# Patient Record
Sex: MALE | Race: WHITE | NOT HISPANIC OR LATINO | URBAN - METROPOLITAN AREA
[De-identification: names, ages, dates, MRNs, and addresses within clinical notes are randomized per-mention and may not be internally consistent; named-entity substitution may affect disease eponyms.]

---

## 2021-01-11 ENCOUNTER — OFFICE VISIT (OUTPATIENT)
Dept: URBAN - METROPOLITAN AREA CLINIC 23 | Facility: CLINIC | Age: 61
End: 2021-01-11
Payer: SELF-PAY

## 2021-01-11 ENCOUNTER — DASHBOARD ENCOUNTERS (OUTPATIENT)
Age: 61
End: 2021-01-11

## 2021-01-11 DIAGNOSIS — K75.4 AUTOIMMUNE HEPATITIS: ICD-10-CM

## 2021-01-11 DIAGNOSIS — K51.80 CHRONIC PANCOLONIC ULCERATIVE COLITIS: ICD-10-CM

## 2021-01-11 PROBLEM — 408335007 AUTOIMMUNE HEPATITIS: Status: ACTIVE | Noted: 2021-01-11

## 2021-01-11 PROCEDURE — 99213 OFFICE O/P EST LOW 20 MIN: CPT | Performed by: INTERNAL MEDICINE

## 2021-01-11 PROCEDURE — G9903 PT SCRN TBCO ID AS NON USER: HCPCS | Performed by: INTERNAL MEDICINE

## 2021-01-11 PROCEDURE — G8427 DOCREV CUR MEDS BY ELIG CLIN: HCPCS | Performed by: INTERNAL MEDICINE

## 2021-01-11 PROCEDURE — 1036F TOBACCO NON-USER: CPT | Performed by: INTERNAL MEDICINE

## 2021-01-11 PROCEDURE — 3017F COLORECTAL CA SCREEN DOC REV: CPT | Performed by: INTERNAL MEDICINE

## 2021-01-11 PROCEDURE — G8482 FLU IMMUNIZE ORDER/ADMIN: HCPCS | Performed by: INTERNAL MEDICINE

## 2021-01-11 PROCEDURE — G8417 CALC BMI ABV UP PARAM F/U: HCPCS | Performed by: INTERNAL MEDICINE

## 2021-01-11 RX ORDER — FOLIC ACID 1 MG/1
1 TABLET TABLET ORAL ONCE A DAY
Status: ACTIVE | COMMUNITY

## 2021-01-11 RX ORDER — MESALAMINE 1.2 G/1
TAKE 1 TABLET BY ORAL ROUTE DAILY FOR 90 DAYS TABLET, DELAYED RELEASE ORAL 1
Qty: 90 | Refills: 5 | Status: ACTIVE | COMMUNITY
Start: 2019-11-25 | End: 2021-05-18

## 2021-01-11 RX ORDER — MESALAMINE 1.2 G/1
TAKE 1 TABLET BY ORAL ROUTE DAILY FOR 90 DAYS TABLET, DELAYED RELEASE ORAL 1
Qty: 90 | Refills: 5
Start: 2019-11-25

## 2021-01-11 RX ORDER — BIOTIN 5 MG
AS DIRECTED TABLET ORAL
Status: ACTIVE | COMMUNITY

## 2021-01-11 RX ORDER — ZINC 25 MG
1 TABLET TABLET ORAL ONCE A DAY
Status: ACTIVE | COMMUNITY

## 2021-01-11 RX ORDER — TURMERIC/TURMERIC ROOT EXTRACT 450MG-50MG
AS DIRECTED CAPSULE ORAL
Status: ACTIVE | COMMUNITY

## 2021-01-11 RX ORDER — AZATHIOPRINE 50 1/1
TAKE 1 TABLET (50 MG) BY ORAL ROUTE ONCE DAILY FOR 30 DAYS TABLET ORAL 1
Qty: 30 | Refills: 5
Start: 2020-03-23

## 2021-01-11 NOTE — HPI-OTHER HISTORIES
60M UC AI here for f/u meds--lialda 1 tab a day, azathioprine 1 tab a day TODAY he is here for routine f/u. feels fine. no complaints BM once a day formed no blood . no change in stool caliber

## 2021-01-12 LAB
% FREE PSA: 32.5
A/G RATIO: 1.6
ALBUMIN: 4
ALKALINE PHOSPHATASE: 88
ALT (SGPT): 30
AST (SGOT): 29
BASO (ABSOLUTE): 0
BASOS: 1
BILIRUBIN, TOTAL: 0.3
BUN/CREATININE RATIO: 17
BUN: 20
CALCIUM: 9.3
CARBON DIOXIDE, TOTAL: 28
CHLORIDE: 104
CHOLESTEROL, TOTAL: 165
COMMENT:: (no result)
CREATININE: 1.2
EGFR IF AFRICN AM: 74
EGFR IF NONAFRICN AM: 64
EOS (ABSOLUTE): 0.1
EOS: 3
GLOBULIN, TOTAL: 2.5
GLUCOSE: 129
HDL CHOLESTEROL: 82
HEMATOCRIT: 42.4
HEMATOLOGY COMMENTS:: (no result)
HEMOGLOBIN: 14.6
IMMATURE CELLS: (no result)
IMMATURE GRANS (ABS): 0
IMMATURE GRANULOCYTES: 0
LDL CHOL CALC (NIH): 72
LYMPHS (ABSOLUTE): 0.8
LYMPHS: 17
MCH: 34
MCHC: 34.4
MCV: 99
MONOCYTES(ABSOLUTE): 0.5
MONOCYTES: 10
NEUTROPHILS (ABSOLUTE): 3.4
NEUTROPHILS: 69
NRBC: (no result)
PLATELETS: 164
POTASSIUM: 4.3
PROSTATE SPECIFIC AG, SERUM: 0.8
PROTEIN, TOTAL: 6.5
PSA, FREE: 0.26
RBC: 4.3
RDW: 12
SODIUM: 142
TRIGLYCERIDES: 56
TSH: 0.86
VLDL CHOLESTEROL CAL: 11
WBC: 4.9

## 2022-02-23 ENCOUNTER — TELEPHONE ENCOUNTER (OUTPATIENT)
Dept: URBAN - METROPOLITAN AREA CLINIC 11 | Facility: CLINIC | Age: 62
End: 2022-02-23

## 2022-02-23 RX ORDER — MESALAMINE 1.2 G/1
TAKE 1 TABLET BY ORAL ROUTE DAILY FOR 90 DAYS TABLET, DELAYED RELEASE ORAL 1
Qty: 90 | Refills: 0
Start: 2019-11-25 | End: 2022-05-24

## 2022-02-25 ENCOUNTER — ERX REFILL RESPONSE (OUTPATIENT)
Dept: URBAN - METROPOLITAN AREA CLINIC 111 | Facility: CLINIC | Age: 62
End: 2022-02-25

## 2022-02-25 RX ORDER — MESALAMINE 1.2 G/1
TAKE 1 TABLET BY ORAL ROUTE DAILY FOR 90 DAYS TABLET, DELAYED RELEASE ORAL 1
Qty: 90 | Refills: 5 | OUTPATIENT

## 2022-02-25 RX ORDER — MESALAMINE 1.2 G/1
TAKE 1 TABLET DAILY TABLET, DELAYED RELEASE ORAL
Qty: 90 TABLET | Refills: 6 | OUTPATIENT

## 2022-03-09 ENCOUNTER — TELEPHONE ENCOUNTER (OUTPATIENT)
Dept: URBAN - METROPOLITAN AREA CLINIC 23 | Facility: CLINIC | Age: 62
End: 2022-03-09

## 2022-03-30 ENCOUNTER — OFFICE VISIT (OUTPATIENT)
Dept: URBAN - METROPOLITAN AREA CLINIC 35 | Facility: CLINIC | Age: 62
End: 2022-03-30

## 2023-07-27 ENCOUNTER — ERX REFILL RESPONSE (OUTPATIENT)
Dept: URBAN - METROPOLITAN AREA CLINIC 111 | Facility: CLINIC | Age: 63
End: 2023-07-27

## 2023-07-27 RX ORDER — MESALAMINE 1.2 G/1
TAKE 1 TABLET DAILY TABLET, DELAYED RELEASE ORAL
Qty: 90 TABLET | Refills: 6 | OUTPATIENT

## 2024-08-14 ENCOUNTER — TELEPHONE ENCOUNTER (OUTPATIENT)
Dept: URBAN - METROPOLITAN AREA CLINIC 6 | Facility: CLINIC | Age: 64
End: 2024-08-14